# Patient Record
Sex: MALE
[De-identification: names, ages, dates, MRNs, and addresses within clinical notes are randomized per-mention and may not be internally consistent; named-entity substitution may affect disease eponyms.]

---

## 2022-08-12 PROBLEM — Z00.00 ENCOUNTER FOR PREVENTIVE HEALTH EXAMINATION: Status: ACTIVE | Noted: 2022-08-12

## 2022-08-26 ENCOUNTER — APPOINTMENT (OUTPATIENT)
Dept: PLASTIC SURGERY | Facility: CLINIC | Age: 32
End: 2022-08-26

## 2022-08-26 DIAGNOSIS — F64.0 TRANSSEXUALISM: ICD-10-CM

## 2022-08-26 PROCEDURE — 99204 OFFICE O/P NEW MOD 45 MIN: CPT | Mod: 95

## 2022-08-27 PROBLEM — F64.0 GENDER DYSPHORIA IN ADOLESCENT AND ADULT: Status: ACTIVE | Noted: 2022-08-27

## 2022-08-27 RX ORDER — TESTOSTERONE CYPIONATE 200 MG/ML
VIAL (ML) INTRAMUSCULAR
Refills: 0 | Status: ACTIVE | COMMUNITY

## 2022-10-03 NOTE — HISTORY OF PRESENT ILLNESS
[Other Location: e.g. School (Enter Location, City,State)___] : at [unfilled], at the time of the visit. [Medical Office: ([facility])___] : at the medical office located in  [Verbal consent obtained from patient] : the patient, [unfilled] [FreeTextEntry1] : 31-year-old man designated female at birth (Vik; he/him) presents for consultation for phalloplasty.  He has been on testosterone since April 2021.  He has been out as a man since 2016.  His goals are to urinate while standing and to be able to have penetrative intercourse with the vagina.  The ability to have penetrative intercourse with the vagina is more important to him than being able to stand to pee.  He is neutral on vaginal closure.  He has not had a hysterectomy.  He is uncertain about burying of the clitoris.  His preferred donor site is the thigh.  He has tattoos on his forearms and does not want to have tattoos on his penis.  He is interested in glans plasty.\par \par He denies nicotine use, occasionally drinks alcohol, and denies other recreational drug use.  There is no family history of VTE.

## 2022-10-03 NOTE — PHYSICAL EXAM
[de-identified] : NAD [de-identified] : Normal respiratory effort [de-identified] : Deferred. [de-identified] : There appears to be greater than 1 cm pinch thickness of the thigh

## 2022-10-03 NOTE — ADDENDUM
[FreeTextEntry1] : 2022-10-03\par \par The patient has 2 letters of assessment and support of phalloplasty.  He will need an in-person appointment to discuss further.  He will also need hysterectomy if he desires vaginal closure.

## 2022-10-03 NOTE — ASSESSMENT
[FreeTextEntry1] : The patient's primary goal is to engage in penetrative intercourse with a vagina.  He is also determined to use a thigh donor site.  Urethral lengthening would result in a phallus that is too thick for practical use.  Thus, for him, I recommend ALT phalloplasty without urethral lengthening.  He can decide if he wants his clitoris to be buried or not.  He can also decide about vaginal closure if he decides to get a hysterectomy.\par \par He will need an in person appointment.  He will also need 2 mental health letters of assessment in support of the procedure.